# Patient Record
Sex: MALE | Race: WHITE | Employment: FULL TIME | URBAN - METROPOLITAN AREA
[De-identification: names, ages, dates, MRNs, and addresses within clinical notes are randomized per-mention and may not be internally consistent; named-entity substitution may affect disease eponyms.]

---

## 2017-11-09 ENCOUNTER — OFFICE VISIT (OUTPATIENT)
Dept: CARDIOLOGY CLINIC | Age: 52
End: 2017-11-09

## 2017-11-09 VITALS
WEIGHT: 204 LBS | DIASTOLIC BLOOD PRESSURE: 96 MMHG | SYSTOLIC BLOOD PRESSURE: 126 MMHG | HEART RATE: 96 BPM | OXYGEN SATURATION: 91 % | BODY MASS INDEX: 28.45 KG/M2

## 2017-11-09 DIAGNOSIS — Z82.49 FAMILY HISTORY OF EARLY CAD: ICD-10-CM

## 2017-11-09 DIAGNOSIS — I10 ESSENTIAL HYPERTENSION: Primary | ICD-10-CM

## 2017-11-09 PROCEDURE — 99213 OFFICE O/P EST LOW 20 MIN: CPT | Performed by: NURSE PRACTITIONER

## 2017-11-09 RX ORDER — LISINOPRIL 20 MG/1
20 TABLET ORAL DAILY
Qty: 30 TABLET | Refills: 3 | Status: SHIPPED | OUTPATIENT
Start: 2017-11-09 | End: 2017-11-09 | Stop reason: SDUPTHER

## 2017-11-09 RX ORDER — PANTOPRAZOLE SODIUM 40 MG/1
40 TABLET, DELAYED RELEASE ORAL DAILY
COMMUNITY
End: 2020-08-17

## 2017-11-09 RX ORDER — LISINOPRIL 20 MG/1
20 TABLET ORAL DAILY
Qty: 30 TABLET | Refills: 11 | Status: SHIPPED | OUTPATIENT
Start: 2017-11-09 | End: 2018-04-26 | Stop reason: SDUPTHER

## 2017-11-09 NOTE — PROGRESS NOTES
[facility]   Cardiac Consultation    Referring Provider:  Jessica Leon MD     Chief Complaint   Patient presents with    Follow-up        History of Present Illness:  Mr. Celeste Valle is a 50year old gentleman here today in follow up for hypertension and family history of CAD. He is a non-smoker. Family history of father  at age 61, both paternal grandparents  in his 66's (MI age 48) grandfather, and 80's for grandmother, and an uncle with CABG at age 40. He is , and has two sons, one college-age and one high school-age. Today he states he feels well. He denies chest pain, shortness of breath, palpitations, dizziness, or edema. At today's visit patient's B/P-126/96 . Patient enjoys golfing. Past Medical History:   has a past medical history of Hernia. Surgical History:   has a past surgical history that includes Knee arthroscopy; hernia repair; and Throat surgery (2016). Social History:   reports that he has never smoked. He has never used smokeless tobacco. He reports that he drinks alcohol. Family History:  family history includes Diabetes in his father; Heart Attack (age of onset: 48) in his paternal grandfather; Heart Attack (age of onset: 61) in his father; Heart Disease in his father and paternal grandmother; Heart Disease (age of onset: 40) in his paternal uncle. Home Medications:  Prior to Admission medications    Medication Sig Start Date End Date Taking? Authorizing Provider   pantoprazole (PROTONIX) 40 MG tablet Take 40 mg by mouth daily   Yes Historical Provider, MD   lisinopril (PRINIVIL;ZESTRIL) 10 MG tablet TAKE 1 TABLET BY MOUTH EVERY DAY 17  Yes Haydee Schneider MD        Allergies:  Review of patient's allergies indicates no known allergies. Review of Systems:   · Constitutional: there has been no unanticipated weight loss. There's been no change in energy level, sleep pattern, or activity level.      · Eyes: No visual changes or diplopia. No scleral icterus. · ENT: No Headaches, hearing loss or vertigo. No mouth sores or sore throat. · Cardiovascular: Reviewed in HPI  · Respiratory: No cough or wheezing, no sputum production. No hematemesis   · Gastrointestinal: No abdominal pain, appetite loss, blood in stools. No change in bowel or bladder habits. · Genitourinary: No dysuria, trouble voiding, or hematuria. · Musculoskeletal:  No gait disturbance, weakness or joint complaints. · Integumentary: No rash or pruritis. · Neurological: No headache, diplopia, change in muscle strength, numbness or tingling. No change in gait, balance, coordination, mood, affect, memory, mentation, behavior. · Psychiatric: No anxiety, no depression. · Endocrine: No malaise, fatigue or temperature intolerance. No excessive thirst, fluid intake, or urination. No tremor. · Hematologic/Lymphatic: No abnormal bruising or bleeding, blood clots or swollen lymph nodes. · Allergic/Immunologic: No nasal congestion or hives. Physical Examination:    Vitals:    11/09/17 1007   BP: (!) 126/96   Pulse:    SpO2:         Constitutional and General Appearance: NAD, appears younger than his stated age   Respiratory:  · Normal excursion and expansion without use of accessory muscles  · Resp Auscultation: Normal breath sounds without dullness. Cardiovascular:  · The apical impulses not displaced. · Heart tones are crisp and normal  · Cervical veins are not engorged  · The carotid upstroke is normal in amplitude and contour without delay or bruit  · Peripheral pulses are symmetrical and full  · There is no clubbing, cyanosis of the extremities.   · No edema  · Femoral Arteries: 2+ and equal  · Pedal Pulses: 2+ and equal   Abdomen:  · No masses or tenderness  · Liver/Spleen: No Abnormalities Noted  Neurological/Psychiatric:  · Alert and oriented in all spheres  · Moves all extremities well  · Exhibits normal gait balance and coordination  · No abnormalities of mood, affect, memory, mentation, or behavior are noted      Assessment/Plan:    1. Essential hypertension     2. Family history of CAD    Plan:  1. Increase Lisinopril to 20 mg daily. 2. Labs followed per PCP  3. Follow up in six months.       I appreciate the opportunity of cooperating in the care of this individual.    Zoey Ward CNP

## 2017-11-09 NOTE — LETTER
[address] [address] [address] [address]  Phone: [phone]  Fax: [phone]    Meredith Deras NP        2017     Jessica Leon MD  [address] [place] [address] [facility]    Patient: Celeste Valle  MR Number: [id number]  YOB: 1965  Date of Visit: 2017    Dear Dr. Jessica Leon:    Thank you for the request for consultation for Celeste Valle to me for the evaluation of HTN. Below are the relevant portions of my assessment and plan of care. [facility]   Cardiac Consultation    Referring Provider:  Jessica Leon MD     Chief Complaint   Patient presents with    Follow-up        History of Present Illness:  Mr. Celeste Valle is a 50year old gentleman here today in follow up for hypertension and family history of CAD. He is a non-smoker. Family history of father  at age 61, both paternal grandparents  in his 66's (MI age 48) grandfather, and 80's for grandmother, and an uncle with CABG at age 40. He is , and has two sons, one college-age and one high school-age. Today he states he feels well. He denies chest pain, shortness of breath, palpitations, dizziness, or edema. At today's visit patient's B/P-126/96 . Patient enjoys golfing. Past Medical History:   has a past medical history of Hernia. Surgical History:   has a past surgical history that includes Knee arthroscopy; hernia repair; and Throat surgery (2016). Social History:   reports that he has never smoked. He has never used smokeless tobacco. He reports that he drinks alcohol. Family History:  family history includes Diabetes in his father; Heart Attack (age of onset: 48) in his paternal grandfather; Heart Attack (age of onset: 61) in his father; Heart Disease in his father and paternal grandmother; Heart Disease (age of onset: 40) in his paternal uncle. Home Medications:  Prior to Admission medications    Medication Sig Start Date End Date Taking? Authorizing Provider   pantoprazole (PROTONIX) 40 MG tablet Take 40 mg by mouth daily   Yes Historical Provider, MD   lisinopril (PRINIVIL;ZESTRIL) 10 MG tablet TAKE 1 TABLET BY MOUTH EVERY DAY 11/1/17  Yes Sol Scott MD        Allergies:  Review of patient's allergies indicates no known allergies. Review of Systems:   · Constitutional: there has been no unanticipated weight loss. There's been no change in energy level, sleep pattern, or activity level. · Eyes: No visual changes or diplopia. No scleral icterus. · ENT: No Headaches, hearing loss or vertigo. No mouth sores or sore throat. · Cardiovascular: Reviewed in HPI  · Respiratory: No cough or wheezing, no sputum production. No hematemesis   · Gastrointestinal: No abdominal pain, appetite loss, blood in stools. No change in bowel or bladder habits. · Genitourinary: No dysuria, trouble voiding, or hematuria. · Musculoskeletal:  No gait disturbance, weakness or joint complaints. · Integumentary: No rash or pruritis. · Neurological: No headache, diplopia, change in muscle strength, numbness or tingling. No change in gait, balance, coordination, mood, affect, memory, mentation, behavior. · Psychiatric: No anxiety, no depression. · Endocrine: No malaise, fatigue or temperature intolerance. No excessive thirst, fluid intake, or urination. No tremor. · Hematologic/Lymphatic: No abnormal bruising or bleeding, blood clots or swollen lymph nodes. · Allergic/Immunologic: No nasal congestion or hives. Physical Examination:    Vitals:    11/09/17 1007   BP: (!) 126/96   Pulse:    SpO2:         Constitutional and General Appearance: NAD, appears younger than his stated age   Respiratory:  · Normal excursion and expansion without use of accessory muscles  · Resp Auscultation: Normal breath sounds without dullness.   Cardiovascular: · The apical impulses not displaced. · Heart tones are crisp and normal  · Cervical veins are not engorged  · The carotid upstroke is normal in amplitude and contour without delay or bruit  · Peripheral pulses are symmetrical and full  · There is no clubbing, cyanosis of the extremities. · No edema  · Femoral Arteries: 2+ and equal  · Pedal Pulses: 2+ and equal   Abdomen:  · No masses or tenderness  · Liver/Spleen: No Abnormalities Noted  Neurological/Psychiatric:  · Alert and oriented in all spheres  · Moves all extremities well  · Exhibits normal gait balance and coordination  · No abnormalities of mood, affect, memory, mentation, or behavior are noted      Assessment/Plan:    1. Essential hypertension     2. Family history of CAD    Plan:  1. Increase Lisinopril to 20 mg daily. 2. Labs followed per PCP  3. Follow up in six months. I appreciate the opportunity of cooperating in the care of this individual.    Sugey Robles CNP             If you have questions, please do not hesitate to call me. I look forward to following Kelechi Carter along with you.     Sincerely,        Zaida Piper NP

## 2017-11-09 NOTE — COMMUNICATION BODY
[facility]   Cardiac Consultation    Referring Provider:  Bhumi Ivan MD     Chief Complaint   Patient presents with    Follow-up        History of Present Illness:  Mr. Jenna Sarkar is a 50year old gentleman here today in follow up for hypertension and family history of CAD. He is a non-smoker. Family history of father  at age 61, both paternal grandparents  in his 66's (MI age 48) grandfather, and 80's for grandmother, and an uncle with CABG at age 40. He is , and has two sons, one college-age and one high school-age. Today he states he feels well. He denies chest pain, shortness of breath, palpitations, dizziness, or edema. At today's visit patient's B/P-126/96 . Patient enjoys golfing. Past Medical History:   has a past medical history of Hernia. Surgical History:   has a past surgical history that includes Knee arthroscopy; hernia repair; and Throat surgery (2016). Social History:   reports that he has never smoked. He has never used smokeless tobacco. He reports that he drinks alcohol. Family History:  family history includes Diabetes in his father; Heart Attack (age of onset: 48) in his paternal grandfather; Heart Attack (age of onset: 61) in his father; Heart Disease in his father and paternal grandmother; Heart Disease (age of onset: 40) in his paternal uncle. Home Medications:  Prior to Admission medications    Medication Sig Start Date End Date Taking? Authorizing Provider   pantoprazole (PROTONIX) 40 MG tablet Take 40 mg by mouth daily   Yes Historical Provider, MD   lisinopril (PRINIVIL;ZESTRIL) 10 MG tablet TAKE 1 TABLET BY MOUTH EVERY DAY 17  Yes Corrie Lopez MD        Allergies:  Review of patient's allergies indicates no known allergies. Review of Systems:   · Constitutional: there has been no unanticipated weight loss. There's been no change in energy level, sleep pattern, or activity level.      · Eyes: abnormalities of mood, affect, memory, mentation, or behavior are noted      Assessment/Plan:    1. Essential hypertension     2. Family history of CAD    Plan:  1. Increase Lisinopril to 20 mg daily. 2. Labs followed per PCP  3. Follow up in six months.       I appreciate the opportunity of cooperating in the care of this individual.    Anna Hatch CNP

## 2018-04-26 DIAGNOSIS — I10 ESSENTIAL HYPERTENSION: Primary | ICD-10-CM

## 2018-04-26 RX ORDER — LISINOPRIL 20 MG/1
20 TABLET ORAL DAILY
Qty: 30 TABLET | Refills: 3 | Status: SHIPPED | OUTPATIENT
Start: 2018-04-26 | End: 2019-07-19 | Stop reason: SDUPTHER

## 2018-05-09 ENCOUNTER — OFFICE VISIT (OUTPATIENT)
Dept: CARDIOLOGY CLINIC | Age: 53
End: 2018-05-09

## 2018-05-09 VITALS
HEART RATE: 80 BPM | DIASTOLIC BLOOD PRESSURE: 82 MMHG | BODY MASS INDEX: 28.87 KG/M2 | SYSTOLIC BLOOD PRESSURE: 118 MMHG | WEIGHT: 207 LBS | OXYGEN SATURATION: 94 %

## 2018-05-09 DIAGNOSIS — Z82.49 FAMILY HISTORY OF EARLY CAD: ICD-10-CM

## 2018-05-09 DIAGNOSIS — I10 ESSENTIAL HYPERTENSION: Primary | ICD-10-CM

## 2018-05-09 PROCEDURE — 99213 OFFICE O/P EST LOW 20 MIN: CPT | Performed by: NURSE PRACTITIONER

## 2019-07-15 DIAGNOSIS — I10 ESSENTIAL HYPERTENSION: ICD-10-CM

## 2019-07-15 RX ORDER — LISINOPRIL 20 MG/1
20 TABLET ORAL DAILY
Qty: 90 TABLET | Refills: 3 | OUTPATIENT
Start: 2019-07-15

## 2019-07-19 DIAGNOSIS — I10 ESSENTIAL HYPERTENSION: ICD-10-CM

## 2019-07-19 RX ORDER — LISINOPRIL 20 MG/1
20 TABLET ORAL DAILY
Qty: 90 TABLET | Refills: 0 | Status: SHIPPED | OUTPATIENT
Start: 2019-07-19 | End: 2019-10-15 | Stop reason: SDUPTHER

## 2019-08-13 ENCOUNTER — HOSPITAL ENCOUNTER (OUTPATIENT)
Age: 54
Discharge: HOME | End: 2019-08-13
Payer: COMMERCIAL

## 2019-08-13 ENCOUNTER — OFFICE VISIT (OUTPATIENT)
Dept: CARDIOLOGY CLINIC | Age: 54
End: 2019-08-13
Payer: COMMERCIAL

## 2019-08-13 VITALS
HEIGHT: 67 IN | BODY MASS INDEX: 30.92 KG/M2 | OXYGEN SATURATION: 98 % | SYSTOLIC BLOOD PRESSURE: 110 MMHG | WEIGHT: 197 LBS | DIASTOLIC BLOOD PRESSURE: 64 MMHG | HEART RATE: 74 BPM

## 2019-08-13 DIAGNOSIS — Z82.49 FAMILY HISTORY OF EARLY CAD: ICD-10-CM

## 2019-08-13 DIAGNOSIS — I10 ESSENTIAL HYPERTENSION: Primary | ICD-10-CM

## 2019-08-13 DIAGNOSIS — Z79.899 LONG-TERM USE OF HIGH-RISK MEDICATION: ICD-10-CM

## 2019-08-13 LAB
A/G RATIO: 2 (ref 1.1–2.2)
ALBUMIN SERPL-MCNC: 4.9 G/DL (ref 3.4–5)
ALP BLD-CCNC: 67 U/L (ref 40–129)
ALT SERPL-CCNC: 24 U/L (ref 10–40)
ANION GAP SERPL CALCULATED.3IONS-SCNC: 17 MMOL/L (ref 3–16)
AST SERPL-CCNC: 21 U/L (ref 15–37)
BILIRUB SERPL-MCNC: 0.6 MG/DL (ref 0–1)
BUN BLDV-MCNC: 10 MG/DL (ref 7–20)
C-REACTIVE PROTEIN: 0.5 MG/L (ref 0–5.1)
CALCIUM SERPL-MCNC: 9.5 MG/DL (ref 8.3–10.6)
CHLORIDE BLD-SCNC: 105 MMOL/L (ref 99–110)
CO2: 23 MMOL/L (ref 21–32)
CREAT SERPL-MCNC: 0.9 MG/DL (ref 0.9–1.3)
GFR AFRICAN AMERICAN: >60
GFR NON-AFRICAN AMERICAN: >60
GLOBULIN: 2.4 G/DL
GLUCOSE BLD-MCNC: 86 MG/DL (ref 70–99)
POTASSIUM SERPL-SCNC: 4.4 MMOL/L (ref 3.5–5.1)
SODIUM BLD-SCNC: 145 MMOL/L (ref 136–145)
TOTAL PROTEIN: 7.3 G/DL (ref 6.4–8.2)

## 2019-08-13 PROCEDURE — 83704 LIPOPROTEIN BLD QUAN PART: CPT

## 2019-08-13 PROCEDURE — 93000 ELECTROCARDIOGRAM COMPLETE: CPT | Performed by: NURSE PRACTITIONER

## 2019-08-13 PROCEDURE — 86140 C-REACTIVE PROTEIN: CPT

## 2019-08-13 PROCEDURE — 99213 OFFICE O/P EST LOW 20 MIN: CPT | Performed by: NURSE PRACTITIONER

## 2019-08-13 PROCEDURE — 36415 COLL VENOUS BLD VENIPUNCTURE: CPT

## 2019-08-13 PROCEDURE — 80053 COMPREHEN METABOLIC PANEL: CPT

## 2019-08-13 NOTE — COMMUNICATION BODY
3. F/U in one year    Overall the patient is stable from CV standpoint    Further evaluation will be based upon the patient's clinical course and testing results. Thank you for allowing to us to participate in the care of Fiorella Wolff.     MER Sena

## 2019-08-13 NOTE — PROGRESS NOTES
function   with an ejection fraction of 55% and uniform myocardial segmental wall   motion. Following stress there was uniform augmentation of all myocardial   segments with appropriate hyperdynamic LV systolic response to stress.      ECG   Normal (Negative) response to exercise.           Assessment:      Diagnosis Orders   1. Essential hypertension   ~controlled on lisinopril  EKG 12 lead   2. Family history of early CAD   ~paternal hx (longevity in maternal family)  ~offers no c/o CP / SOB  ~stays active. Does not smoke EKG 12 lead    LIPOPROTEIN NMR    C-REACTIVE PROTEIN   3. Long-term use of high-risk medication   ~ace Comprehensive Metabolic Panel     I had the opportunity to review the clinical symptoms and presentation of Satya Pendleton. Plan:     1. EKG: sinus rhythm 70  2. NMR as routine : consider Crestor as primary prevention   3. F/U in one year    Overall the patient is stable from CV standpoint    I have addresed the patient's cardiac risk factors and adjusted pharmacologic treatment as needed. In addition, I have reinforced the need for patient directed risk factor modification. Further evaluation will be based upon the patient's clinical course and testing results. All questions and concerns were addressed to the patient. Alternatives to my treatment were discussed. The patient is not currently smoking. The risks related to smoking were reviewed with the patient. Recommend maintaining a smoke-free lifestyle. Patient is not on a beta-blocker : neg CAD / MI  Patient is on an ace-i/ARB  Patient is not on a statin : neg CAD    Dual Antiplatelet therapy / anti-coagulation has not been recommended / prescribed for this patient. The patient verbalizes understanding not to stop medications without discussing with us. Discussed exercise: 30-60 minutes 7 days/week : plays a lot of golf and walks  Discussed Low saturated fat diet.      Thank you for allowing to us to participate in the

## 2019-08-13 NOTE — LETTER
JVP, normal carotid pulses with no bruits and thyroid normal size. LUNGS:  No increased work of breathing and clear to auscultation, no crackles or wheezing  CARDIOVASCULAR:  Regular rate 88 and rhythm with no murmurs, gallops, rubs, or abnormal heart sounds, normal PMI. The apical impulses not displaced  JVP less than 8 cm H2O  Heart tones are crisp and normal  Cervical veins are not engorged  The carotid upstroke is normal in amplitude and contour without delay or bruit  JVP is not elevated  ABDOMEN:  Normal bowel sounds, non-distended and non-tender to palpation  EXT: No edema, no calf tenderness. Pulses are present bilaterally. DATA:      Radiology Review:  Pertinent images / reports were reviewed as a part of this visit and reveals the following:    Stress Echo: May '16:  Summary   Normal stress echocardiogram study.       Rest      ECG   Normal sinus rhythm.      Standing HR:78 bpmStanding BP:156/109 mmHg      Stress      Stress Type: Exercise      Stress Protocol: Ravi      Rest HR: 78 bpm                           HR BP Product: [id number]   Rest BP: 156/109 mmHg                     Max Exercise: 10 METS   Stress Peak HR: 155 bpm   Stress Peak BP: 206/92 mmHg   Predicted HR: 171 bpm   % of predicted HR: 91   Test Duration: 11 min and 12 sec   Reason for Termination: Target heart rate      Results      Echo (rest): Normal (LVEF >50%)      Echo (stress): Hyperkinetic (LVEF >70%)      Echo   Baseline resting echocardiogram shows normal global LV systolic function   with an ejection fraction of 55% and uniform myocardial segmental wall   motion. Following stress there was uniform augmentation of all myocardial   segments with appropriate hyperdynamic LV systolic response to stress.      ECG   Normal (Negative) response to exercise.           Assessment:      Diagnosis Orders   1. Essential hypertension   ~controlled on lisinopril  EKG 12 lead   2.  Family history of early CAD

## 2019-08-16 LAB
CHOLESTEROL, TOTAL: 188 MG/DL
EER LIPOPROFILE BY NMR: ABNORMAL
HDL PARTICLE NO, NMR: 33.6 UMOL/L
HDL SIZE: 8.4 NM
HDLC SERPL-MCNC: 45 MG/DL (ref 40–59)
LARGE HDL PARTICLE, NMR: <2.8 UMOL/L
LARGE VLDL PARTICLE, NMR: 4.9 NMOL/L
LDL CHOLESTEROL: 122 MG/DL
LDL PARTICLE NUMBER, NMR: 1427 NMOL/L
LDL PARTICLE SIZE: 20.9 NM
SMALL LDL PARTICLE, NMR: 633 NMOL/L
TRIGL SERPL-MCNC: 103 MG/DL (ref 30–149)
VLDL SIZE: 49.2 NM

## 2019-10-15 DIAGNOSIS — I10 ESSENTIAL HYPERTENSION: ICD-10-CM

## 2019-10-15 RX ORDER — LISINOPRIL 20 MG/1
TABLET ORAL
Qty: 90 TABLET | Refills: 0 | Status: SHIPPED | OUTPATIENT
Start: 2019-10-15 | End: 2020-01-10

## 2020-01-10 RX ORDER — LISINOPRIL 20 MG/1
TABLET ORAL
Qty: 90 TABLET | Refills: 1 | Status: SHIPPED | OUTPATIENT
Start: 2020-01-10 | End: 2020-07-06

## 2020-07-06 RX ORDER — LISINOPRIL 20 MG/1
TABLET ORAL
Qty: 90 TABLET | Refills: 3 | Status: SHIPPED | OUTPATIENT
Start: 2020-07-06 | End: 2021-07-06

## 2020-07-06 NOTE — TELEPHONE ENCOUNTER
Requested Prescriptions     Pending Prescriptions Disp Refills    lisinopril (PRINIVIL;ZESTRIL) 20 MG tablet [Pharmacy Med Name: LISINOPRIL 20 MG TABLET] 90 tablet 1     Sig: TAKE 1 TABLET BY MOUTH EVERY DAY          Number: 90    Refills: 3    Last Office Visit: 8/13/2019     Next Office Visit: 8/17/2020

## 2020-08-17 ENCOUNTER — OFFICE VISIT (OUTPATIENT)
Dept: CARDIOLOGY CLINIC | Age: 55
End: 2020-08-17
Payer: COMMERCIAL

## 2020-08-17 VITALS
WEIGHT: 184.8 LBS | SYSTOLIC BLOOD PRESSURE: 110 MMHG | BODY MASS INDEX: 25.87 KG/M2 | HEIGHT: 71 IN | HEART RATE: 80 BPM | DIASTOLIC BLOOD PRESSURE: 80 MMHG

## 2020-08-17 PROCEDURE — 99213 OFFICE O/P EST LOW 20 MIN: CPT | Performed by: INTERNAL MEDICINE

## 2020-08-17 RX ORDER — PANTOPRAZOLE SODIUM 40 MG/1
40 TABLET, DELAYED RELEASE ORAL DAILY
COMMUNITY

## 2020-08-17 NOTE — PROGRESS NOTES
[facility]   Cardiac Follow Up     Referring Provider:  Mesfin Shaffer MD     Chief Complaint   Patient presents with    Hypertension    Hyperlipidemia        History of Present Illness:  Yashira Elizabeth is 48 y.o. male who presents today with a history of HTN and paternal FH heart disease . He reports he has been feeling well and denies complaints. Denies exertional chest pain, MCQUEEN/PND, palpitations, light-headedness, edema. With regard to medication therapy the patient has been compliant with prescribed regimen. They have tolerated therapy to date. History of heart disease in his father. Past Medical History:   has a past medical history of Hernia. Surgical History:   has a past surgical history that includes Knee arthroscopy; hernia repair; and Throat surgery (06/2016). Social History:   reports that he has never smoked. He has never used smokeless tobacco. He reports current alcohol use. Family History:  family history includes Diabetes in his father; Heart Attack (age of onset: 48) in his paternal grandfather; Heart Attack (age of onset: 61) in his father; Heart Disease in his father and paternal grandmother; Heart Disease (age of onset: 40) in his paternal uncle. Home Medications:  Prior to Admission medications    Medication Sig Start Date End Date Taking? Authorizing Provider   pantoprazole (PROTONIX) 40 MG tablet Take 40 mg by mouth daily   Yes Historical Provider, MD   lisinopril (PRINIVIL;ZESTRIL) 20 MG tablet TAKE 1 TABLET BY MOUTH EVERY DAY 7/6/20  Yes Anjana Acosta MD        Allergies:  Patient has no known allergies. Review of Systems:   · Constitutional: there has been no unanticipated weight loss. There's been no change in energy level, sleep pattern, or activity level. · Eyes: No visual changes or diplopia. No scleral icterus. · ENT: No Headaches, hearing loss or vertigo. No mouth sores or sore throat.   · Cardiovascular: Reviewed in HPI  · Respiratory: No cough or wheezing, no sputum production. No hematemesis. · Gastrointestinal: No abdominal pain, appetite loss, blood in stools. No change in bowel or bladder habits. · Genitourinary: No dysuria, trouble voiding, or hematuria. · Musculoskeletal:  No gait disturbance, weakness or joint complaints. · Integumentary: No rash or pruritis. · Neurological: No headache, diplopia, change in muscle strength, numbness or tingling. No change in gait, balance, coordination, mood, affect, memory, mentation, behavior. · Psychiatric: No anxiety, no depression. · Endocrine: No malaise, fatigue or temperature intolerance. No excessive thirst, fluid intake, or urination. No tremor. · Hematologic/Lymphatic: No abnormal bruising or bleeding, blood clots or swollen lymph nodes. · Allergic/Immunologic: No nasal congestion or hives. Physical Examination:    Vitals:    08/17/20 1351   BP: 110/80   Pulse: 80        Wt Readings from Last 1 Encounters:   08/17/20 184 lb 12.8 oz (83.8 kg)       Constitutional and General Appearance: NAD    Respiratory:  · Normal excursion and expansion without use of accessory muscles  · Resp Auscultation: Normal breath sounds without dullness  Cardiovascular:  · The apical impulses not displaced  · Heart tones are crisp and normal  · Cervical veins are not engorged  · The carotid upstroke is normal in amplitude and contour without delay or bruit  · Peripheral pulses are symmetrical and full  · There is no clubbing, cyanosis of the extremities. · No edema  · Femoral Arteries: 2+ and equal  · Pedal Pulses: 2+ and equal   Abdomen:  · No masses or tenderness  · Liver/Spleen: No Abnormalities Noted  Neurological/Psychiatric:  · Alert and oriented in all spheres  · Moves all extremities well  · Exhibits normal gait balance and coordination  · No abnormalities of mood, affect, memory, mentation, or behavior are noted      Assessment:       1.  Essential hypertension ~controlled on lisinopril   Blood pressure 110/80, pulse 80, height 5' 11\" (1.803 m), weight 184 lb 12.8 oz (83.8 kg). 2. Family history of early CAD   ~paternal hx (longevity in maternal family)  ~offers no c/o CP / SOB  ~stays active. Does not smoke     3. Hyperlipidemia -   Plan for CT Cardiac Calcium Score- based on results will decide if more aggressive lipid management is needed. Plan:  Stephanie Pleitez has a stable cardiac status. Cardiac test and lab results personally reviewed by me during this office visit and discussed. No med changes. CT Cardiac Calcium Score- based on results will decide if more aggressive lipid management is needed. Continue risk factor modifications. Call for any change in symptoms, call to report any changes in shortness of breath or development of chest pain with activity. Return for regular follow up in 12 months. I appreciate the opportunity of cooperating in the care of this individual.    Myesha Hernandez. Dayami Diamond M.D., [facility]    Patient's problem list, medications, allergies, past medical, surgical, social and family histories were reviewed and updated as appropriate. Scribe's attestation: This note was scribed in the presence of Dr. Dayami Diamond MD, by Sarah Benites RN. The scribe's documentation has been prepared under my direction and personally reviewed by me in its entirety. I confirm that the note above accurately reflects all work, treatment, procedures, and medical decision making performed by me.

## 2021-09-01 NOTE — PROGRESS NOTES
[address]   Cardiac Follow Up     Referring Provider:  Evon De Guzman DO     Chief Complaint   Patient presents with    1 Year Follow Up    Hypertension        History of Present Illness:  Tana Ryan is 48 y.o. male who presents for annual follow up. He has a history of HTN and paternal FH heart disease . Today Tawny Gunn is doing well. He is feeling good and has no cardiac complaints. He did not have his CT calcium score done because he was feeling good. He has also not had his cholesterol re checked. With regard to medication therapy the patient has been compliant with prescribed regimen. They have tolerated therapy to date. History of heart disease in his father. Past Medical History:   has a past medical history of Hernia. Surgical History:   has a past surgical history that includes Knee arthroscopy; hernia repair; and Throat surgery (06/2016). Social History:   reports that he has never smoked. He has never used smokeless tobacco. He reports current alcohol use. Family History:  family history includes Diabetes in his father; Heart Attack (age of onset: 48) in his paternal grandfather; Heart Attack (age of onset: 61) in his father; Heart Disease in his father and paternal grandmother; Heart Disease (age of onset: 40) in his paternal uncle. Home Medications:  Prior to Admission medications    Medication Sig Start Date End Date Taking? Authorizing Provider   lisinopril (PRINIVIL;ZESTRIL) 20 MG tablet TAKE 1 TABLET BY MOUTH EVERY DAY 7/6/21  Yes Sharan Barbosa MD   pantoprazole (PROTONIX) 40 MG tablet Take 40 mg by mouth daily   Yes Historical Provider, MD        Allergies:  Patient has no known allergies. Review of Systems:   · Constitutional: there has been no unanticipated weight loss. There's been no change in energy level, sleep pattern, or activity level. · Eyes: No visual changes or diplopia. No scleral icterus.   · ENT: No Headaches, hearing loss or vertigo. No mouth sores or sore throat. · Cardiovascular: Reviewed in HPI  · Respiratory: No cough or wheezing, no sputum production. No hematemesis. · Gastrointestinal: No abdominal pain, appetite loss, blood in stools. No change in bowel or bladder habits. · Genitourinary: No dysuria, trouble voiding, or hematuria. · Musculoskeletal:  No gait disturbance, weakness or joint complaints. · Integumentary: No rash or pruritis. · Neurological: No headache, diplopia, change in muscle strength, numbness or tingling. No change in gait, balance, coordination, mood, affect, memory, mentation, behavior. · Psychiatric: No anxiety, no depression. · Endocrine: No malaise, fatigue or temperature intolerance. No excessive thirst, fluid intake, or urination. No tremor. · Hematologic/Lymphatic: No abnormal bruising or bleeding, blood clots or swollen lymph nodes. · Allergic/Immunologic: No nasal congestion or hives. Physical Examination:    Vitals:    09/09/21 0908   BP: 136/74   Pulse: 76   SpO2: 98%        Wt Readings from Last 1 Encounters:   09/09/21 185 lb (83.9 kg)       Constitutional and General Appearance: NAD    Respiratory:  · Normal excursion and expansion without use of accessory muscles  · Resp Auscultation: Normal breath sounds without dullness  Cardiovascular:  · The apical impulses not displaced  · Heart tones are crisp and normal  · Cervical veins are not engorged  · The carotid upstroke is normal in amplitude and contour without delay or bruit  · Peripheral pulses are symmetrical and full  · There is no clubbing, cyanosis of the extremities.   · No edema  · Femoral Arteries: 2+ and equal  · Pedal Pulses: 2+ and equal   Abdomen:  · No masses or tenderness  · Liver/Spleen: No Abnormalities Noted  Neurological/Psychiatric:  · Alert and oriented in all spheres  · Moves all extremities well  · Exhibits normal gait balance and coordination  · No abnormalities of mood, affect, memory, mentation, or behavior are noted      Assessment:       1. Essential hypertension   ~controlled on lisinopril   Blood pressure 136/74, pulse 76, height 5' 11\" (1.803 m), weight 185 lb (83.9 kg), SpO2 98 %. 2. Family history of early CAD   ~paternal hx (longevity in maternal family)  ~offers no c/o CP / SOB  ~stays active. Does not smoke     3. Hyperlipidemia - 8/2019   HDL 45            ~ repeat lipid panel to determine treatment            ~ discussed CT Calcium score, declines for now  Plan:  Marely Cee has a stable cardiac status. Cardiac test and lab results personally reviewed by me during this office visit and discussed. No med changes. Re check lipid panel to determine what medication is needed   Continue risk factor modifications. Call for any change in symptoms, call to report any changes in shortness of breath or development of chest pain with activity. Return for regular follow up in 12 months. I appreciate the opportunity of cooperating in the care of this individual.    Brayan Cavanaugh. Noemy Silverman M.D., [facility] [place]    Patient's problem list, medications, allergies, past medical, surgical, social and family histories were reviewed and updated as appropriate. Scribe's Attestation: This note was scribed in the presence of Dr. Pat Scott MD by Daysi Negron RN. 09/09/21     The scribe's documentation has been prepared under my direction and personally reviewed by me in its entirety. I confirm that the note above accurately reflects all work, treatment, procedures, and medical decision making performed by me.

## 2021-09-09 ENCOUNTER — OFFICE VISIT (OUTPATIENT)
Dept: CARDIOLOGY CLINIC | Age: 56
End: 2021-09-09
Payer: COMMERCIAL

## 2021-09-09 VITALS
DIASTOLIC BLOOD PRESSURE: 74 MMHG | HEIGHT: 71 IN | SYSTOLIC BLOOD PRESSURE: 136 MMHG | OXYGEN SATURATION: 98 % | BODY MASS INDEX: 25.9 KG/M2 | HEART RATE: 76 BPM | WEIGHT: 185 LBS

## 2021-09-09 DIAGNOSIS — E78.5 HYPERLIPIDEMIA, UNSPECIFIED HYPERLIPIDEMIA TYPE: Primary | ICD-10-CM

## 2021-09-09 PROCEDURE — 99214 OFFICE O/P EST MOD 30 MIN: CPT | Performed by: INTERNAL MEDICINE

## 2021-09-09 NOTE — PATIENT INSTRUCTIONS
Have your cholesterol checked. We will call after the results to determine if/what medication is needed to treat. Call the office if you do not hear anything within a week of having blood work done [phone]  No CT calcium score for now.

## 2021-09-27 ENCOUNTER — TELEPHONE (OUTPATIENT)
Dept: CARDIOLOGY CLINIC | Age: 56
End: 2021-09-27

## 2021-09-27 NOTE — TELEPHONE ENCOUNTER
Pt calling had labs done 2 weeks ago at Dr Janice Daily office and he has not got the results yet. Spoke to Alexandra she stated she will call Dr Tye Reis office to get the results.  Pls call to advise Thank you

## 2021-09-28 DIAGNOSIS — E78.5 HYPERLIPIDEMIA, UNSPECIFIED HYPERLIPIDEMIA TYPE: Primary | ICD-10-CM

## 2021-09-28 RX ORDER — ATORVASTATIN CALCIUM 40 MG/1
40 TABLET, FILM COATED ORAL DAILY
Qty: 90 TABLET | Refills: 1 | Status: SHIPPED | OUTPATIENT
Start: 2021-09-28 | End: 2022-03-11

## 2021-10-04 DIAGNOSIS — I10 ESSENTIAL HYPERTENSION: ICD-10-CM

## 2021-10-04 RX ORDER — LISINOPRIL 20 MG/1
TABLET ORAL
Qty: 90 TABLET | Refills: 0 | OUTPATIENT
Start: 2021-10-04

## 2021-10-07 ENCOUNTER — TELEPHONE (OUTPATIENT)
Dept: CARDIOLOGY CLINIC | Age: 56
End: 2021-10-07

## 2021-10-07 DIAGNOSIS — I10 ESSENTIAL HYPERTENSION: ICD-10-CM

## 2021-10-07 RX ORDER — LISINOPRIL 20 MG/1
TABLET ORAL
Qty: 90 TABLET | Refills: 3 | Status: SHIPPED | OUTPATIENT
Start: 2021-10-07 | End: 2022-10-03

## 2021-10-07 NOTE — TELEPHONE ENCOUNTER
Medication Refill    Medication needing refilled: lisinopril (PRINIVIL;ZESTRIL) 20 MG tablet    Dosage of the medication: 20 mg    How are you taking this medication (QD, BID, TID, QID, PRN): 1 tab daily    30 or 90 day supply called in: 90    When will you run out of your medication: Pt is out of medication    Which Pharmacy are we sending the medication to?: [facility]/pharmacy #[id number] [place], [place] - [facility] [address] [address], [address] [address]   Phone:  [phone]  Fax:  [phone]

## 2022-03-11 RX ORDER — ATORVASTATIN CALCIUM 40 MG/1
TABLET, FILM COATED ORAL
Qty: 90 TABLET | Refills: 1 | Status: SHIPPED | OUTPATIENT
Start: 2022-03-11 | End: 2022-03-11 | Stop reason: SDUPTHER

## 2022-03-11 RX ORDER — ATORVASTATIN CALCIUM 40 MG/1
40 TABLET, FILM COATED ORAL DAILY
Qty: 90 TABLET | Refills: 1 | Status: SHIPPED | OUTPATIENT
Start: 2022-03-11

## 2022-03-11 NOTE — TELEPHONE ENCOUNTER
Received refill request for Atorvastatin from [facility] pharmacy.     Last ov: 09/09/2021 LES    Last labs: 09/14/2021 Lipids (care everywhere)    Last Refill: 09/28/2021 #90 w/ 1 refill    Next appointment: 09/09/2022 LES (recall)

## 2022-03-14 RX ORDER — ATORVASTATIN CALCIUM 40 MG/1
40 TABLET, FILM COATED ORAL DAILY
Qty: 90 TABLET | Refills: 1 | Status: CANCELLED | OUTPATIENT
Start: 2022-03-14

## 2022-09-29 DIAGNOSIS — I10 ESSENTIAL HYPERTENSION: ICD-10-CM

## 2022-09-29 NOTE — TELEPHONE ENCOUNTER
Received refill request for Lisinopril from [facility] pharmacy.     Last ov: 09/09/2021 LES    Last labs: 09/14/2021    Last Refill: 10/07/2021 #90 w/ 3 refills    Next appointment: 12/16/2022 LES

## 2022-10-03 RX ORDER — LISINOPRIL 20 MG/1
TABLET ORAL
Qty: 90 TABLET | Refills: 3 | Status: SHIPPED | OUTPATIENT
Start: 2022-10-03

## 2022-11-14 RX ORDER — ATORVASTATIN CALCIUM 40 MG/1
TABLET, FILM COATED ORAL
Qty: 90 TABLET | Refills: 1 | Status: SHIPPED | OUTPATIENT
Start: 2022-11-14

## 2022-11-14 NOTE — TELEPHONE ENCOUNTER
Received refill request for Atorvastatin from [facility] pharmacy.     Last ov: 09/09/2021 LES    Last Refill: 03/11/2022 #90 w/ 1 refills    Next appointment: 12/16/2022 LES

## 2022-11-16 PROBLEM — E78.5 HYPERLIPIDEMIA: Status: ACTIVE | Noted: 2022-11-16

## 2022-11-16 NOTE — PROGRESS NOTES
[address]   Cardiac Follow Up     Referring Provider:  Donavon Alexander DO     No chief complaint on file. History of Present Illness:  Sharon Albarran is 48 y.o. male who presents for annual follow up. He has a history of HTN and paternal FH heart disease . Today Gissell Vargas is doing well. He is feeling good and has no cardiac complaints. He did not have his CT calcium score done because he was feeling good. He has also not had his cholesterol re checked. With regard to medication therapy the patient has been compliant with prescribed regimen. They have tolerated therapy to date. History of heart disease in his father. Past Medical History:   has a past medical history of Hernia. Surgical History:   has a past surgical history that includes Knee arthroscopy; hernia repair; and Throat surgery (06/2016). Social History:   reports that he has never smoked. He has never used smokeless tobacco. He reports current alcohol use. Family History:  family history includes Diabetes in his father; Heart Attack (age of onset: 48) in his paternal grandfather; Heart Attack (age of onset: 61) in his father; Heart Disease in his father and paternal grandmother; Heart Disease (age of onset: 40) in his paternal uncle. Home Medications:  Prior to Admission medications    Medication Sig Start Date End Date Taking? Authorizing Provider   atorvastatin (LIPITOR) 40 MG tablet TAKE 1 TABLET BY MOUTH EVERY DAY 11/14/22   Marian Maldonado MD   lisinopril (PRINIVIL;ZESTRIL) 20 MG tablet TAKE 1 TABLET BY MOUTH EVERY DAY 10/3/22   Marian Maldonado MD   pantoprazole (PROTONIX) 40 MG tablet Take 40 mg by mouth daily    Historical Provider, MD        Allergies:  Patient has no known allergies. Review of Systems:   Constitutional: there has been no unanticipated weight loss. There's been no change in energy level, sleep pattern, or activity level. Eyes: No visual changes or diplopia.  No scleral icterus. ENT: No Headaches, hearing loss or vertigo. No mouth sores or sore throat. Cardiovascular: Reviewed in HPI  Respiratory: No cough or wheezing, no sputum production. No hematemesis. Gastrointestinal: No abdominal pain, appetite loss, blood in stools. No change in bowel or bladder habits. Genitourinary: No dysuria, trouble voiding, or hematuria. Musculoskeletal:  No gait disturbance, weakness or joint complaints. Integumentary: No rash or pruritis. Neurological: No headache, diplopia, change in muscle strength, numbness or tingling. No change in gait, balance, coordination, mood, affect, memory, mentation, behavior. Psychiatric: No anxiety, no depression. Endocrine: No malaise, fatigue or temperature intolerance. No excessive thirst, fluid intake, or urination. No tremor. Hematologic/Lymphatic: No abnormal bruising or bleeding, blood clots or swollen lymph nodes. Allergic/Immunologic: No nasal congestion or hives. Physical Examination:    There were no vitals filed for this visit. Wt Readings from Last 1 Encounters:   09/09/21 185 lb (83.9 kg)       Constitutional and General Appearance: NAD    Respiratory:  Normal excursion and expansion without use of accessory muscles  Resp Auscultation: Normal breath sounds without dullness  Cardiovascular: The apical impulses not displaced  Heart tones are crisp and normal  Cervical veins are not engorged  The carotid upstroke is normal in amplitude and contour without delay or bruit  Peripheral pulses are symmetrical and full  There is no clubbing, cyanosis of the extremities.   No edema  Femoral Arteries: 2+ and equal  Pedal Pulses: 2+ and equal   Abdomen:  No masses or tenderness  Liver/Spleen: No Abnormalities Noted  Neurological/Psychiatric:  Alert and oriented in all spheres  Moves all extremities well  Exhibits normal gait balance and coordination  No abnormalities of mood, affect, memory, mentation, or behavior are noted      Assessment:       1. Essential hypertension   ~controlled on lisinopril   There were no vitals taken for this visit. 2. Family history of early CAD   ~paternal hx (longevity in maternal family)  ~offers no c/o CP / SOB  ~stays active. Does not smoke     3. Hyperlipidemia - 8/2019   HDL 45            ~ repeat lipid panel to determine treatment            ~ discussed CT Calcium score, declines for now  Plan:  Estefania Loo has a stable cardiac status. Cardiac test and lab results personally reviewed by me during this office visit and discussed. No med changes. Re check lipid panel to determine what medication is needed   Continue risk factor modifications. Call for any change in symptoms, call to report any changes in shortness of breath or development of chest pain with activity. Return for regular follow up in 12 months. I appreciate the opportunity of cooperating in the care of this individual.    Adebayo Mercer. Iglesia No M.D., [facility] - [place]    Patient's problem list, medications, allergies, past medical, surgical, social and family histories were reviewed and updated as appropriate. Scribe's Attestation: This note was scribed in the presence of Dr. Sabas Sterling MD by John Thomason RN. 11/16/22     The scribe's documentation has been prepared under my direction and personally reviewed by me in its entirety. I confirm that the note above accurately reflects all work, treatment, procedures, and medical decision making performed by me.

## 2022-12-15 NOTE — PROGRESS NOTES
[facility]   Cardiac Follow Up     Referring Provider:  Rachell Martinez DO     No chief complaint on file. History of Present Illness:  Rocio Interiano is 48 y.o. male who presents for annual follow up. He has a history of HTN and paternal FH heart disease . Today Arturo Lino is ***    With regard to medication therapy the patient has been compliant with prescribed regimen. They have tolerated therapy to date. History of heart disease in his father. Past Medical History:   has a past medical history of Hernia. Surgical History:   has a past surgical history that includes Knee arthroscopy; hernia repair; and Throat surgery (06/2016). Social History:   reports that he has never smoked. He has never used smokeless tobacco. He reports current alcohol use. Family History:  family history includes Diabetes in his father; Heart Attack (age of onset: 48) in his paternal grandfather; Heart Attack (age of onset: 61) in his father; Heart Disease in his father and paternal grandmother; Heart Disease (age of onset: 40) in his paternal uncle. Home Medications:  Prior to Admission medications    Medication Sig Start Date End Date Taking? Authorizing Provider   atorvastatin (LIPITOR) 40 MG tablet TAKE 1 TABLET BY MOUTH EVERY DAY 11/14/22   Katerina Kapoor MD   lisinopril (PRINIVIL;ZESTRIL) 20 MG tablet TAKE 1 TABLET BY MOUTH EVERY DAY 10/3/22   Katerina Kapoor MD   pantoprazole (PROTONIX) 40 MG tablet Take 40 mg by mouth daily    Historical Provider, MD        Allergies:  Patient has no known allergies. Review of Systems:   Constitutional: there has been no unanticipated weight loss. There's been no change in energy level, sleep pattern, or activity level. Eyes: No visual changes or diplopia. No scleral icterus. ENT: No Headaches, hearing loss or vertigo. No mouth sores or sore throat. Cardiovascular: Reviewed in HPI  Respiratory: No cough or wheezing, no sputum production. No hematemesis. Gastrointestinal: No abdominal pain, appetite loss, blood in stools. No change in bowel or bladder habits. Genitourinary: No dysuria, trouble voiding, or hematuria. Musculoskeletal:  No gait disturbance, weakness or joint complaints. Integumentary: No rash or pruritis. Neurological: No headache, diplopia, change in muscle strength, numbness or tingling. No change in gait, balance, coordination, mood, affect, memory, mentation, behavior. Psychiatric: No anxiety, no depression. Endocrine: No malaise, fatigue or temperature intolerance. No excessive thirst, fluid intake, or urination. No tremor. Hematologic/Lymphatic: No abnormal bruising or bleeding, blood clots or swollen lymph nodes. Allergic/Immunologic: No nasal congestion or hives. Physical Examination:    There were no vitals filed for this visit. Wt Readings from Last 1 Encounters:   09/09/21 185 lb (83.9 kg)       Constitutional and General Appearance: NAD    Respiratory:  Normal excursion and expansion without use of accessory muscles  Resp Auscultation: Normal breath sounds without dullness  Cardiovascular: The apical impulses not displaced  Heart tones are crisp and normal  Cervical veins are not engorged  The carotid upstroke is normal in amplitude and contour without delay or bruit  Peripheral pulses are symmetrical and full  There is no clubbing, cyanosis of the extremities. No edema  Femoral Arteries: 2+ and equal  Pedal Pulses: 2+ and equal   Abdomen:  No masses or tenderness  Liver/Spleen: No Abnormalities Noted  Neurological/Psychiatric:  Alert and oriented in all spheres  Moves all extremities well  Exhibits normal gait balance and coordination  No abnormalities of mood, affect, memory, mentation, or behavior are noted      Assessment:    1.  Essential hypertension   There were no vitals filed for this visit.  -Controlled/Not well controlled***  -BP goal <130/80  -Home BP monitoring encouraged, printed information provided on how to accurately measure BP at home.  -Counseled to follow a low salt diet to assure blood pressure remains controlled for cardiovascular risk factor modification.   -The patient is counseled to get regular exercise 3-5 times per week and maintain a healthy weight reduce cardiovascular risk factors.    -Continue medical therapy with Lisinopril 20 mg daily      2. Family history of early CAD   -paternal hx (longevity in maternal family)  -stays active. Does not smoke      3. Hyperlipidemia -   -8/2019   HDL 45   -discussed CT Calc    Plan:  Mery Bucio has a stable cardiac status. Cardiac test and lab results personally reviewed by me during this office visit and discussed. Continue risk factor modifications. Call for any change in symptoms, call to report any changes in shortness of breath or development of chest pain with activity. Return for regular follow up in ***months. I appreciate the opportunity of cooperating in the care of this individual.    Inez Eugene. [place] M.D., [facility] - [place]    Patient's problem list, medications, allergies, past medical, surgical, social and family histories were reviewed and updated as appropriate.      Danielibraymond's Attestation:

## 2022-12-15 NOTE — PROGRESS NOTES
[facility]   Cardiac Follow Up     Referring Provider:  Marianna Rosas DO     Chief Complaint   Patient presents with    Hypertension    Hyperlipidemia          History of Present Illness:  Author Fili is 48 y.o. male who presents for annual follow up. He has a history of HTN and paternal FH heart disease . Today Teolorna Ortiz is here for 1 year follow up. He is working, trying to play golf still. His BP has been good, he only checks it every few weeks. Heart and lungs sound good on auscultation today. He will have labs completed at Dr Florentin Mak office and results sent over to me. No complaints of CP, SOB, palpitations or dizziness today. With regard to medication therapy the patient has been compliant with prescribed regimen. They have tolerated therapy to date. History of heart disease in his father. Past Medical History:   has a past medical history of Hernia. Surgical History:   has a past surgical history that includes Knee arthroscopy; hernia repair; and Throat surgery (06/2016). Social History:   reports that he has never smoked. He has never used smokeless tobacco. He reports current alcohol use. Family History:  family history includes Diabetes in his father; Heart Attack (age of onset: 48) in his paternal grandfather; Heart Attack (age of onset: 61) in his father; Heart Disease in his father and paternal grandmother; Heart Disease (age of onset: 40) in his paternal uncle. Home Medications:  Prior to Admission medications    Medication Sig Start Date End Date Taking? Authorizing Provider   atorvastatin (LIPITOR) 40 MG tablet TAKE 1 TABLET BY MOUTH EVERY DAY 11/14/22  Yes Vivek Beltran MD   lisinopril (PRINIVIL;ZESTRIL) 20 MG tablet TAKE 1 TABLET BY MOUTH EVERY DAY 10/3/22  Yes Vivek Beltran MD   pantoprazole (PROTONIX) 40 MG tablet Take 40 mg by mouth daily   Yes Historical Provider, MD        Allergies:  Patient has no known allergies.      Review of Systems:   Constitutional: there has been no unanticipated weight loss. There's been no change in energy level, sleep pattern, or activity level. Eyes: No visual changes or diplopia. No scleral icterus. ENT: No Headaches, hearing loss or vertigo. No mouth sores or sore throat. Cardiovascular: Reviewed in HPI  Respiratory: No cough or wheezing, no sputum production. No hematemesis. Gastrointestinal: No abdominal pain, appetite loss, blood in stools. No change in bowel or bladder habits. Genitourinary: No dysuria, trouble voiding, or hematuria. Musculoskeletal:  No gait disturbance, weakness or joint complaints. Integumentary: No rash or pruritis. Neurological: No headache, diplopia, change in muscle strength, numbness or tingling. No change in gait, balance, coordination, mood, affect, memory, mentation, behavior. Psychiatric: No anxiety, no depression. Endocrine: No malaise, fatigue or temperature intolerance. No excessive thirst, fluid intake, or urination. No tremor. Hematologic/Lymphatic: No abnormal bruising or bleeding, blood clots or swollen lymph nodes. Allergic/Immunologic: No nasal congestion or hives. Physical Examination:    Vitals:    12/16/22 0920   BP: 112/78   Pulse: 83   SpO2: 98%          Wt Readings from Last 1 Encounters:   12/16/22 194 lb (88 kg)       Constitutional and General Appearance: NAD    Respiratory:  Normal excursion and expansion without use of accessory muscles  Resp Auscultation: Normal breath sounds without dullness  Cardiovascular: The apical impulses not displaced  Heart tones are crisp and normal  Cervical veins are not engorged  The carotid upstroke is normal in amplitude and contour without delay or bruit  Peripheral pulses are symmetrical and full  There is no clubbing, cyanosis of the extremities.   No edema  Femoral Arteries: 2+ and equal  Pedal Pulses: 2+ and equal   Abdomen:  No masses or tenderness  Liver/Spleen: No Abnormalities Noted  Neurological/Psychiatric:  Alert and oriented in all spheres  Moves all extremities well  Exhibits normal gait balance and coordination  No abnormalities of mood, affect, memory, mentation, or behavior are noted      Assessment:    1. Essential hypertension   Vitals:    12/16/22 0920   BP: 112/78   Pulse: 83   SpO2: 98%   -Controlled  -BP goal <130/80  -Home BP monitoring encouraged, printed information provided on how to accurately measure BP at home.  -Counseled to follow a low salt diet to assure blood pressure remains controlled for cardiovascular risk factor modification.   -The patient is counseled to get regular exercise 3-5 times per week and maintain a healthy weight reduce cardiovascular risk factors.    -Continue medical therapy with Lisinopril 20 mg daily      2. Family history of early CAD   -paternal hx (longevity in maternal family)  -stays active. Does not smoke      3. Hyperlipidemia -   -9/14/21  TG 77 HDL 57   -discussed CT Calc    Plan:  José Ramos has a stable cardiac status. Cardiac test and lab results personally reviewed by me during this office visit and discussed. Continue risk factor modifications. Call for any change in symptoms, call to report any changes in shortness of breath or development of chest pain with activity. Lipid panel and CMP soon   If cholesterol is high then we will possibly increase dose if needed   Return for regular follow up in 12 months. I appreciate the opportunity of cooperating in the care of this individual.    Kelton Watkins. Mya Givens M.D., [facility] - [place]    Patient's problem list, medications, allergies, past medical, surgical, social and family histories were reviewed and updated as appropriate. Scribe Attestation: This note was scribed in the presence of Dr. Mya Givens by George Davis RN. The scribe's documentation has been prepared under my direction and personally reviewed by me in its entirety.  I confirm that the note above accurately reflects all work, treatment, procedures, and medical decision making performed by me.

## 2022-12-16 ENCOUNTER — OFFICE VISIT (OUTPATIENT)
Dept: CARDIOLOGY CLINIC | Age: 57
End: 2022-12-16

## 2022-12-16 VITALS
HEIGHT: 71 IN | SYSTOLIC BLOOD PRESSURE: 112 MMHG | DIASTOLIC BLOOD PRESSURE: 78 MMHG | BODY MASS INDEX: 27.16 KG/M2 | WEIGHT: 194 LBS | OXYGEN SATURATION: 98 % | HEART RATE: 83 BPM

## 2022-12-16 DIAGNOSIS — Z82.49 FAMILY HISTORY OF EARLY CAD: ICD-10-CM

## 2022-12-16 DIAGNOSIS — I10 ESSENTIAL HYPERTENSION: Primary | ICD-10-CM

## 2022-12-16 DIAGNOSIS — E78.5 HYPERLIPIDEMIA, UNSPECIFIED HYPERLIPIDEMIA TYPE: ICD-10-CM

## 2022-12-16 RX ORDER — ATORVASTATIN CALCIUM 40 MG/1
TABLET, FILM COATED ORAL
Qty: 90 TABLET | Refills: 3 | Status: SHIPPED | OUTPATIENT
Start: 2022-12-16

## 2023-01-24 ENCOUNTER — TELEPHONE (OUTPATIENT)
Dept: CARDIOLOGY CLINIC | Age: 58
End: 2023-01-24

## 2023-01-24 NOTE — TELEPHONE ENCOUNTER
Pt called asking if the office has received the result of his labs that were done at his PCP office 12/27 and would like to know if everything is of ?     Pls advise thank you   Adam  Ph. [phone]

## 2023-09-16 DIAGNOSIS — I10 ESSENTIAL HYPERTENSION: ICD-10-CM

## 2023-09-18 RX ORDER — LISINOPRIL 20 MG/1
TABLET ORAL
Qty: 90 TABLET | Refills: 3 | Status: SHIPPED | OUTPATIENT
Start: 2023-09-18

## 2023-09-18 NOTE — TELEPHONE ENCOUNTER
Received refill request for lisinopril from [facility] located within [facility] pharmacy.     Last ov: 12/16/2022    Last Refill:10/03/2022 #90 w/ 3    Next appointment:12/15/2023

## 2024-03-01 ENCOUNTER — OFFICE VISIT (OUTPATIENT)
Dept: CARDIOLOGY CLINIC | Age: 59
End: 2024-03-01
Payer: COMMERCIAL

## 2024-03-01 VITALS
HEART RATE: 86 BPM | DIASTOLIC BLOOD PRESSURE: 66 MMHG | BODY MASS INDEX: 26.52 KG/M2 | OXYGEN SATURATION: 98 % | WEIGHT: 189.4 LBS | SYSTOLIC BLOOD PRESSURE: 118 MMHG | HEIGHT: 71 IN

## 2024-03-01 DIAGNOSIS — E78.5 HYPERLIPIDEMIA, UNSPECIFIED HYPERLIPIDEMIA TYPE: ICD-10-CM

## 2024-03-01 DIAGNOSIS — I10 ESSENTIAL HYPERTENSION: Primary | ICD-10-CM

## 2024-03-01 DIAGNOSIS — Z82.49 FAMILY HISTORY OF EARLY CAD: ICD-10-CM

## 2024-03-01 PROCEDURE — 93000 ELECTROCARDIOGRAM COMPLETE: CPT | Performed by: INTERNAL MEDICINE

## 2024-03-01 PROCEDURE — 99214 OFFICE O/P EST MOD 30 MIN: CPT | Performed by: INTERNAL MEDICINE

## 2024-03-01 PROCEDURE — 3078F DIAST BP <80 MM HG: CPT | Performed by: INTERNAL MEDICINE

## 2024-03-01 PROCEDURE — 3074F SYST BP LT 130 MM HG: CPT | Performed by: INTERNAL MEDICINE

## 2024-03-01 RX ORDER — LISINOPRIL 20 MG/1
20 TABLET ORAL DAILY
Qty: 90 TABLET | Refills: 3 | Status: SHIPPED | OUTPATIENT
Start: 2024-03-01

## 2024-03-01 RX ORDER — ATORVASTATIN CALCIUM 40 MG/1
TABLET, FILM COATED ORAL
Qty: 90 TABLET | Refills: 3 | Status: SHIPPED | OUTPATIENT
Start: 2024-03-01

## 2024-03-01 NOTE — PROGRESS NOTES
[facility]   Cardiac Follow Up     Referring Provider:  Yung Patricia DO     Chief Complaint   Patient presents with    Hyperlipidemia    Hypertension    1 Year Follow Up        History of Present Illness:  Adam Wolf is 58 y.o. male who presents for annual follow up. He has a history of HTN and paternal FH heart disease .      12/2023 > post robotic right recurrent inguinal hernia repair with mesh and removal spermatic cord lipoma.     Today Adam is here for 1 year follow up. He is following with his surgeon for ongoing pain after hernia repair. He denies cardiac symptoms; no chest pain, palpitations, MCQUEEN, dizziness, or edema.       With regard to medication therapy the patient has been compliant with prescribed regimen. They have tolerated therapy to date.  History of heart disease in his father.     Past Medical History:   has a past medical history of Hernia.    Surgical History:   has a past surgical history that includes Knee arthroscopy; hernia repair (12/2023); and Throat surgery (06/2016).     Social History:   reports that he has never smoked. He has never used smokeless tobacco. He reports current alcohol use.     Family History:  family history includes Diabetes in his father; Heart Attack (age of onset: 50) in his paternal grandfather; Heart Attack (age of onset: 60) in his father; Heart Disease in his father and paternal grandmother; Heart Disease (age of onset: 37) in his paternal uncle.     Home Medications:  Prior to Admission medications    Medication Sig Start Date End Date Taking? Authorizing Provider   lisinopril (PRINIVIL;ZESTRIL) 20 MG tablet TAKE 1 TABLET BY MOUTH EVERY DAY 9/18/23  Yes Angelo Chang MD   atorvastatin (LIPITOR) 40 MG tablet TAKE 1 TABLET BY MOUTH EVERY DAY 12/16/22  Yes Angelo Chang MD   pantoprazole (PROTONIX) 40 MG tablet Take 1 tablet by mouth daily   Yes Provider, MD Pamela        Allergies:  Patient has no known allergies.     Review 
hearing loss or vertigo. No mouth sores or sore throat.  Cardiovascular: Reviewed in HPI  Respiratory: No cough or wheezing, no sputum production. No hematemesis.    Gastrointestinal: No abdominal pain, appetite loss, blood in stools. No change in bowel or bladder habits.  Genitourinary: No dysuria, trouble voiding, or hematuria.  Musculoskeletal:  No gait disturbance, weakness or joint complaints.  Integumentary: No rash or pruritis.  Neurological: No headache, diplopia, change in muscle strength, numbness or tingling. No change in gait, balance, coordination, mood, affect, memory, mentation, behavior.  Psychiatric: No anxiety, no depression.  Endocrine: No malaise, fatigue or temperature intolerance. No excessive thirst, fluid intake, or urination. No tremor.  Hematologic/Lymphatic: No abnormal bruising or bleeding, blood clots or swollen lymph nodes.  Allergic/Immunologic: No nasal congestion or hives.    Physical Examination:    There were no vitals filed for this visit.         Wt Readings from Last 1 Encounters:   12/16/22 88 kg (194 lb)       Constitutional and General Appearance: NAD    Respiratory:  Normal excursion and expansion without use of accessory muscles  Resp Auscultation: Normal breath sounds without dullness  Cardiovascular:  The apical impulses not displaced  Heart tones are crisp and normal  Cervical veins are not engorged  The carotid upstroke is normal in amplitude and contour without delay or bruit  Peripheral pulses are symmetrical and full  There is no clubbing, cyanosis of the extremities.  No edema  Femoral Arteries: 2+ and equal  Pedal Pulses: 2+ and equal   Abdomen:  No masses or tenderness  Liver/Spleen: No Abnormalities Noted  Neurological/Psychiatric:  Alert and oriented in all spheres  Moves all extremities well  Exhibits normal gait balance and coordination  No abnormalities of mood, affect, memory, mentation, or behavior are noted    Echo 5/5/16  Summary   -Normal left ventricle

## 2024-12-10 RX ORDER — ATORVASTATIN CALCIUM 40 MG/1
TABLET, FILM COATED ORAL
Qty: 90 TABLET | Refills: 3 | Status: SHIPPED | OUTPATIENT
Start: 2024-12-10

## 2024-12-10 NOTE — TELEPHONE ENCOUNTER
Received refill request for atorvastatin (LIPITOR) 40 MG tablet  from [facility] pharmacy.     Last OV: 03- LES    Next OV: 03- LES    Last Labs: 12- Lipid at [facility]    Last Filled: 03- LES

## 2025-02-19 DIAGNOSIS — I10 ESSENTIAL HYPERTENSION: ICD-10-CM

## 2025-02-19 NOTE — TELEPHONE ENCOUNTER
Received refill request for lisinopril (PRINIVIL;ZESTRIL) 20 MG tablet  from [facility] pharmacy.     Last OV: 3/1/2024 LES    Next OV: 3/24/2025 LES    Last Labs: 12/16/2022 CMP    Last Filled: 3/1/2024 LES

## 2025-02-20 RX ORDER — LISINOPRIL 20 MG/1
20 TABLET ORAL DAILY
Qty: 90 TABLET | Refills: 3 | Status: SHIPPED | OUTPATIENT
Start: 2025-02-20

## 2025-07-28 NOTE — PROGRESS NOTES
[facility]   Cardiac Follow Up     Referring Provider:  Yung Patricia DO     Chief Complaint   Patient presents with    1 Year Follow Up    Hyperlipidemia    Hypertension        History of Present Illness:  Adam Wolf is 60 y.o. male who presents for annual follow up. He has a history of HTN and paternal FH heart disease .      12/2023 > post robotic right recurrent inguinal hernia repair with mesh and removal spermatic cord lipoma.       6/27/25 -6/28/25 - Presented to [facility] with syncope and hypotension via EMS.  EMS reported blood pressure of 80/50.  Notation that he was postop day 0 for hernia mesh revision and nerve ligation [facility]  He did have diaphoresis and was lightheaded prior to syncopal episode.  He was unresponsive for 1-2 minutes prior to Wife calling EMS.  Reported that he had just taken an Oxycodone tablet prior to episode and that he has had strong reactions to narcotic pain medicine in the past.  He was found to have an abdominal hematoma that was not actively bleeding and had orthostatic hypotension.  Hemoglobin stable.  He received 2 liters of IV fluids with improvement in symptoms and was stable for discharge.        Today, Adam is here for a 12 month follow up given his extensive family history.  States that he is still recovering from hernia surgery that he had done about 5 weeks ago.  He originally had hernia surgery back in December/2023 and never felt right after that so he went to [facility] and had the mesh removed and then a triple neurectomy about 5 weeks ago.  Reports that he has always been sensitive to pain medication.  He took 1 Oxycontin and about 30 minutes later he went to walk to the bathroom and he felt dizzy, weak and sweaty and he passed out resulting in his Wife calling the ambulance and was taken to [place] at which time a CT scan was ordered and he was found to have a hematoma, not actively bleeding.  He

## 2025-07-31 ENCOUNTER — OFFICE VISIT (OUTPATIENT)
Dept: CARDIOLOGY CLINIC | Age: 60
End: 2025-07-31
Payer: COMMERCIAL

## 2025-07-31 VITALS
HEIGHT: 71 IN | HEART RATE: 75 BPM | OXYGEN SATURATION: 95 % | DIASTOLIC BLOOD PRESSURE: 84 MMHG | BODY MASS INDEX: 26.5 KG/M2 | SYSTOLIC BLOOD PRESSURE: 122 MMHG | WEIGHT: 189.3 LBS

## 2025-07-31 DIAGNOSIS — Z82.49 FAMILY HISTORY OF EARLY CAD: ICD-10-CM

## 2025-07-31 DIAGNOSIS — I10 ESSENTIAL HYPERTENSION: ICD-10-CM

## 2025-07-31 DIAGNOSIS — E78.5 HYPERLIPIDEMIA, UNSPECIFIED HYPERLIPIDEMIA TYPE: Primary | ICD-10-CM

## 2025-07-31 PROCEDURE — 99214 OFFICE O/P EST MOD 30 MIN: CPT | Performed by: INTERNAL MEDICINE

## 2025-07-31 PROCEDURE — 3074F SYST BP LT 130 MM HG: CPT | Performed by: INTERNAL MEDICINE

## 2025-07-31 PROCEDURE — 3079F DIAST BP 80-89 MM HG: CPT | Performed by: INTERNAL MEDICINE

## 2025-07-31 RX ORDER — ATORVASTATIN CALCIUM 40 MG/1
TABLET, FILM COATED ORAL
Qty: 90 TABLET | Refills: 3 | Status: SHIPPED | OUTPATIENT
Start: 2025-07-31

## 2025-07-31 NOTE — PATIENT INSTRUCTIONS
No medication changes today     Labs soon - Lipid Panel - FASTING blood work - Will call you with the results    Continue risk factor modifications  Call for any change in symptoms, call to report any changes in shortness of breath or development of chest pain with activity.    Follow up with Dr. Chang in 12 months